# Patient Record
Sex: FEMALE | Race: WHITE | NOT HISPANIC OR LATINO | Employment: STUDENT | URBAN - METROPOLITAN AREA
[De-identification: names, ages, dates, MRNs, and addresses within clinical notes are randomized per-mention and may not be internally consistent; named-entity substitution may affect disease eponyms.]

---
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
## 2024-01-12 ENCOUNTER — TELEPHONE (OUTPATIENT)
Dept: PRIMARY CARE | Facility: CLINIC | Age: 19
End: 2024-01-12
Payer: COMMERCIAL

## 2024-01-12 NOTE — TELEPHONE ENCOUNTER
Pt mother left a voice message stating the pt has been having some housing issues, with her apartment being fixed and issues with landlord causing the pt stress and wants to know if her zoloft can be increased while she is dealing with these issues

## 2024-08-05 ENCOUNTER — OFFICE VISIT (OUTPATIENT)
Dept: PRIMARY CARE | Facility: CLINIC | Age: 19
End: 2024-08-05
Payer: COMMERCIAL

## 2024-08-05 VITALS
DIASTOLIC BLOOD PRESSURE: 70 MMHG | HEART RATE: 99 BPM | WEIGHT: 149 LBS | HEIGHT: 63 IN | SYSTOLIC BLOOD PRESSURE: 118 MMHG | OXYGEN SATURATION: 99 % | BODY MASS INDEX: 26.4 KG/M2

## 2024-08-05 DIAGNOSIS — F41.1 GAD (GENERALIZED ANXIETY DISORDER): Primary | ICD-10-CM

## 2024-08-05 PROBLEM — I47.9 TACHYCARDIA, PAROXYSMAL (MULTI): Status: RESOLVED | Noted: 2023-10-11 | Resolved: 2024-08-05

## 2024-08-05 PROBLEM — D68.01: Status: RESOLVED | Noted: 2023-10-11 | Resolved: 2024-08-05

## 2024-08-05 PROBLEM — D68.00 VON WILLEBRAND DISEASE (MULTI): Status: RESOLVED | Noted: 2023-10-11 | Resolved: 2024-08-05

## 2024-08-05 PROCEDURE — 1036F TOBACCO NON-USER: CPT

## 2024-08-05 PROCEDURE — 3008F BODY MASS INDEX DOCD: CPT

## 2024-08-05 PROCEDURE — 99213 OFFICE O/P EST LOW 20 MIN: CPT

## 2024-08-05 RX ORDER — SERTRALINE HYDROCHLORIDE 50 MG/1
50 TABLET, FILM COATED ORAL DAILY
Qty: 90 TABLET | Refills: 1 | Status: SHIPPED | OUTPATIENT
Start: 2024-08-05

## 2024-08-05 ASSESSMENT — ENCOUNTER SYMPTOMS
SLEEP DISTURBANCE: 0
FEVER: 0
NERVOUS/ANXIOUS: 1
CHILLS: 0
DYSPHORIC MOOD: 0

## 2024-08-05 ASSESSMENT — PATIENT HEALTH QUESTIONNAIRE - PHQ9
SUM OF ALL RESPONSES TO PHQ9 QUESTIONS 1 AND 2: 0
2. FEELING DOWN, DEPRESSED OR HOPELESS: NOT AT ALL
1. LITTLE INTEREST OR PLEASURE IN DOING THINGS: NOT AT ALL

## 2024-08-05 ASSESSMENT — PAIN SCALES - GENERAL: PAINLEVEL: 0-NO PAIN

## 2024-08-05 NOTE — PROGRESS NOTES
"Subjective   Patient ID: Sanjana Dahl is a 19 y.o. female who presents for Follow-up (Stopped taking zoloft /la).    Hx of NAYLA    NAYLA: patient stopped Zoloft because felt no longer needed. She was fine of medication for awhile. However, with school starting back up soon patient feels he wants to go back on as anxiety has increased. Will be a [employer] at [employer], living off campus. In the past have filled out both emotional support animal letter and [employer] disability paperwork Reduced Distractibility testing environment and priorty schedule to help reduce stress/anxiety/panic attacks. Has done therapy in the past but not now. Has tried Prozac in the past. Denies depression, SI, sleep disturbance.                 Review of Systems   Constitutional:  Negative for chills and fever.   Psychiatric/Behavioral:  Negative for dysphoric mood, sleep disturbance and suicidal ideas. The patient is nervous/anxious.        Objective   /70   Pulse 99   Ht 1.6 m (5' 3\")   Wt 67.6 kg (149 lb)   SpO2 99%   BMI 26.39 kg/m²     Physical Exam  Constitutional:       General: She is not in acute distress.     Appearance: Normal appearance.   Cardiovascular:      Rate and Rhythm: Normal rate and regular rhythm.      Heart sounds: No murmur heard.  Pulmonary:      Effort: Pulmonary effort is normal.      Breath sounds: Normal breath sounds. No wheezing, rhonchi or rales.   Skin:     General: Skin is warm and dry.      Findings: No rash.   Neurological:      Mental Status: She is alert.   Psychiatric:         Mood and Affect: Mood and affect normal.         Assessment/Plan   Diagnoses and all orders for this visit:  NAYLA (generalized anxiety disorder)  -     sertraline (Zoloft) 50 mg tablet; Take 1 tablet (50 mg) by mouth once daily.  Take 1/2 pill for I-2 weeks then can back up to 50 mg dose. Follow-up for CPE.        "

## 2024-08-13 ASSESSMENT — PROMIS GLOBAL HEALTH SCALE
RATE_PHYSICAL_HEALTH: GOOD
RATE_GENERAL_HEALTH: VERY GOOD
EMOTIONAL_PROBLEMS: OFTEN
RATE_AVERAGE_FATIGUE: MILD
RATE_SOCIAL_SATISFACTION: VERY GOOD
CARRYOUT_PHYSICAL_ACTIVITIES: COMPLETELY
RATE_MENTAL_HEALTH: GOOD
CARRYOUT_SOCIAL_ACTIVITIES: GOOD
RATE_QUALITY_OF_LIFE: GOOD
RATE_AVERAGE_PAIN: 0

## 2024-08-15 ENCOUNTER — OFFICE VISIT (OUTPATIENT)
Dept: PRIMARY CARE | Facility: CLINIC | Age: 19
End: 2024-08-15
Payer: COMMERCIAL

## 2024-08-15 VITALS
WEIGHT: 149.2 LBS | HEIGHT: 63 IN | BODY MASS INDEX: 26.44 KG/M2 | HEART RATE: 92 BPM | SYSTOLIC BLOOD PRESSURE: 108 MMHG | DIASTOLIC BLOOD PRESSURE: 66 MMHG | OXYGEN SATURATION: 99 %

## 2024-08-15 DIAGNOSIS — F41.1 GAD (GENERALIZED ANXIETY DISORDER): ICD-10-CM

## 2024-08-15 DIAGNOSIS — Z00.00 ANNUAL PHYSICAL EXAM: Primary | ICD-10-CM

## 2024-08-15 PROCEDURE — 3008F BODY MASS INDEX DOCD: CPT

## 2024-08-15 PROCEDURE — 1036F TOBACCO NON-USER: CPT

## 2024-08-15 PROCEDURE — 99395 PREV VISIT EST AGE 18-39: CPT

## 2024-08-15 RX ORDER — SERTRALINE HYDROCHLORIDE 50 MG/1
50 TABLET, FILM COATED ORAL DAILY
Qty: 90 TABLET | Refills: 1 | Status: SHIPPED | OUTPATIENT
Start: 2024-08-15

## 2024-08-15 ASSESSMENT — ENCOUNTER SYMPTOMS
PALPITATIONS: 0
ADENOPATHY: 0
MYALGIAS: 0
ARTHRALGIAS: 0
LIGHT-HEADEDNESS: 0
NAUSEA: 0
COUGH: 0
DIARRHEA: 0
SORE THROAT: 0
DYSURIA: 0
DIFFICULTY URINATING: 0
WHEEZING: 0
NERVOUS/ANXIOUS: 0
VOMITING: 0
FATIGUE: 0
SHORTNESS OF BREATH: 0
HEMATURIA: 0
BLOOD IN STOOL: 0
FEVER: 0
DIAPHORESIS: 0

## 2024-08-15 ASSESSMENT — PAIN SCALES - GENERAL: PAINLEVEL: 0-NO PAIN

## 2024-08-15 NOTE — PROGRESS NOTES
"Subjective   Patient ID: Sanjana Dahl is a 19 y.o. female who presents for Annual Exam.    Hx of NAYLA, von Wilbrand disease    Other providers: Dr. Snyder (hematologist), Ob/gyn (does not remember name)    NAYLA: Restarted 50 mg Zoloft due to school starting back up and increase in anxiety. Anxiety has imrpoved and is happy with current dose. Will be a [employer] at [employer], living off campus. In the past have filled out both emotional support animal letter and [employer] disability paperwork Reduced Distractibility testing environment and priorty schedule to help reduce stress/anxiety/panic attacks. Has done therapy in the past but not now. Has tried Prozac in the past. Denies depression, SI, sleep disturbance.     Fhx: +Anethesia related problems, heart disease, DM    HM: PAP start 20. Mammogram start 39. Colon screening start 44. Vaccinations 7/2015 UTD, HPV UTD                  Review of Systems   Constitutional:  Negative for diaphoresis, fatigue and fever.   HENT:  Negative for congestion, hearing loss and sore throat.    Eyes:  Negative for visual disturbance.   Respiratory:  Negative for cough, shortness of breath and wheezing.    Cardiovascular:  Negative for chest pain, palpitations and leg swelling.   Gastrointestinal:  Negative for blood in stool, diarrhea, nausea and vomiting.   Genitourinary:  Negative for difficulty urinating, dysuria and hematuria.   Musculoskeletal:  Negative for arthralgias and myalgias.   Skin:  Negative for rash.   Allergic/Immunologic: Negative for immunocompromised state.   Neurological:  Negative for syncope and light-headedness.   Hematological:  Negative for adenopathy.   Psychiatric/Behavioral:  Negative for suicidal ideas. The patient is not nervous/anxious.        Objective   /66   Pulse 92   Ht 1.6 m (5' 3\")   Wt 67.7 kg (149 lb 3.2 oz)   SpO2 99%   BMI 26.43 kg/m²     Physical Exam  Constitutional:       General: She is not in acute distress.     Appearance: Normal appearance. "   HENT:      Right Ear: Tympanic membrane and ear canal normal.      Left Ear: Tympanic membrane and ear canal normal.      Nose: Nose normal.      Mouth/Throat:      Mouth: Mucous membranes are moist.      Pharynx: Oropharynx is clear. No oropharyngeal exudate or posterior oropharyngeal erythema.   Eyes:      Extraocular Movements: Extraocular movements intact.      Conjunctiva/sclera: Conjunctivae normal.      Pupils: Pupils are equal, round, and reactive to light.   Neck:      Thyroid: No thyroid mass or thyromegaly.   Cardiovascular:      Rate and Rhythm: Normal rate and regular rhythm.      Pulses: Normal pulses.      Heart sounds: No murmur heard.     No gallop.   Pulmonary:      Effort: Pulmonary effort is normal.      Breath sounds: No wheezing, rhonchi or rales.   Abdominal:      General: Bowel sounds are normal.      Palpations: Abdomen is soft. There is no hepatomegaly, splenomegaly or mass.      Tenderness: There is no abdominal tenderness. There is no guarding.   Musculoskeletal:         General: Normal range of motion.      Right lower leg: No edema.      Left lower leg: No edema.   Lymphadenopathy:      Cervical: No cervical adenopathy.   Skin:     General: Skin is warm.      Findings: No rash.   Neurological:      General: No focal deficit present.      Mental Status: She is alert.      Motor: Motor function is intact. No weakness.   Psychiatric:         Mood and Affect: Mood normal.         Thought Content: Thought content normal.         Assessment/Plan   Diagnoses and all orders for this visit:  Annual physical exam  NAYLA (generalized anxiety disorder)  -     sertraline (Zoloft) 50 mg tablet; Take 1 tablet (50 mg) by mouth once daily.

## 2024-08-29 DIAGNOSIS — D68.00 VON WILLEBRAND DISEASE (MULTI): Primary | ICD-10-CM

## 2024-08-29 RX ORDER — TRANEXAMIC ACID 650 MG/1
1300 TABLET ORAL 3 TIMES DAILY
Qty: 42 TABLET | Refills: 2 | Status: SHIPPED | OUTPATIENT
Start: 2024-08-29 | End: 2024-09-05

## 2024-10-04 DIAGNOSIS — D68.00 VON WILLEBRAND DISEASE (MULTI): Primary | ICD-10-CM

## 2024-10-09 ENCOUNTER — DOCUMENTATION (OUTPATIENT)
Dept: PEDIATRIC HEMATOLOGY/ONCOLOGY | Facility: HOSPITAL | Age: 19
End: 2024-10-09
Payer: COMMERCIAL

## 2024-10-09 ENCOUNTER — HOSPITAL ENCOUNTER (OUTPATIENT)
Dept: PEDIATRIC HEMATOLOGY/ONCOLOGY | Facility: HOSPITAL | Age: 19
Discharge: HOME | End: 2024-10-09
Payer: COMMERCIAL

## 2024-10-09 VITALS
BODY MASS INDEX: 25.56 KG/M2 | WEIGHT: 149.69 LBS | RESPIRATION RATE: 18 BRPM | SYSTOLIC BLOOD PRESSURE: 112 MMHG | DIASTOLIC BLOOD PRESSURE: 77 MMHG | TEMPERATURE: 97.7 F | HEART RATE: 75 BPM | HEIGHT: 64 IN

## 2024-10-09 DIAGNOSIS — D68.00 VON WILLEBRAND DISEASE (MULTI): Primary | ICD-10-CM

## 2024-10-09 PROCEDURE — 2500000001 HC RX 250 WO HCPCS SELF ADMINISTERED DRUGS (ALT 637 FOR MEDICARE OP)

## 2024-10-09 PROCEDURE — 99215 OFFICE O/P EST HI 40 MIN: CPT

## 2024-10-09 PROCEDURE — 99211 OFF/OP EST MAY X REQ PHY/QHP: CPT

## 2024-10-09 RX ORDER — LIDOCAINE 40 MG/G
CREAM TOPICAL ONCE
Status: COMPLETED | OUTPATIENT
Start: 2024-10-09 | End: 2024-10-09

## 2024-10-09 ASSESSMENT — ENCOUNTER SYMPTOMS
OCCASIONAL FEELINGS OF UNSTEADINESS: 0
LOSS OF SENSATION IN FEET: 0
DEPRESSION: 0

## 2024-10-09 ASSESSMENT — PAIN SCALES - GENERAL: PAINLEVEL: 0-NO PAIN

## 2024-10-09 NOTE — PROGRESS NOTES
10/09/24 1629   Reason for Consult   Discipline Child Life Specialist  This child life specialist (CCLS) introduced self and to patient and patient's family. Patient familiar with child life from previous hospital experiences. Patient particularly interested in facility dog. Facility dog not available at this time.   Referral Source Physician/Resident   Total Time Spent (min) 30 minutes   Anxiety Level   Anxiety Level Patient displays anticipatory anxiety  Patient overall calm prior to IV placement. Patient verbalized that she typically passes out with needles and is more anxious about that than the IV itself. CCLS provided active listening and validation.    Patient Intervention(s)   Preparation Intervention(s) Address misconceptions;Coping skill development;Coping plan development/coordination/implemention;Medical/procedural preparation  Patient verbalized preference to lay down during IV and look away. Patient does not have a strong preference otherwise. Patient unable to identify things that have been or may be helpful. CCLS encouraged patient to drink some apple juice and provided a facility dog stuffed animal for comfort. Patient able to remain calm and cooperative for IV start, but did pass out following poke. Patient able to remain calm following episode and was quick to return to baseline.   Support Provided to Family   Family Present for Patient Session Mother     Patient/mother shared that patient is having a dental surgery tomorrow. CCLS encouraged patient/mother to share information about patient typically passing out with IVs and ensured that they would advocate for themselves as needed. CCLS advocated for numbing cream to be sent with patient to be applied in the morning and encouraged patient/mother to ask about options for anxiety (medications, IV after laughing gas, etc.). Patient and mother appreciative. No further child life needs identified at this time. CCLS will continue to follow and  provide support as needed.      Amber Samson MS, CCLS  Family and Child Life Services

## 2024-10-09 NOTE — PROGRESS NOTES
Patient in accompanied by mom for her annual & pre-treatment visit with the [facility] team. Patient has insurance and PCP is [facility]ccarry [facility]. Patient reports overall health has been good and bleeding disorder management will be discussed at this visit, see medical notes. Patient reports mental & emotional health are managed well, depressive episodes managed with no attempts or discussion of suicide, no attempts at self harm or attempting to hurt others, no manic or depressive mood swings, no rage or uncontrolled anger or outbursts, doesn't isolate, engages well with family, peers and community. managing usual and customary stressors. 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 Patient will be infused today in preparation for wisdom teeth coming out tomorrow. Mother is in the room for support as patient doesn't like needles. Patient reports doing well, reports no threats or hazards, basic needs are met, no need for support service referrals at this time. Patient soon to make an appointment with OB/GYN to update preventative care. SW to follow up as needed.     Transition Milestones:   Able to give 6-12 month history & update of BDO management at visits       Able to name and understand bleeding disorder(BDO) medication; name, why medication is needed, impact on the body & BDO management, what happens if medication is skipped, etc.      Can identify people other than parents who understands BDO and type of help needed in case of an injury or emergency.      Knows when to seek emergency care; knows what to tell them about BDO     Understands the when & why of diagnosis disclosure and understands why it's important to disclose to some but not necessary to disclose to all. Understands if and when accommodations might be needed      Able to name sports or activities that may be off limits & why     Understands  Importance of physical activity and healthy diet choices in overall health and as one with a BDO       Anushka PAL   Pediatric & Adult   [facility]

## 2024-10-11 NOTE — PROGRESS NOTES
HTC Nursing Note    Sanjana is a 19 y.o with type I von Willebrand disease who presented to the clinic today for administration of Vonvendi prior to wisdom teeth extraction and for her annual comprehensive visit. Patient was assessed by the physician, nursing, physical therapist and . 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 She is scheduled for wisdom teeth removal Thursday morning and came to clinic to receive a dose of Vonvendi.     Sanjana expressed anxiety regarding IV placement due to previous experiences of fainting during the procedure. To address this, lidocaine cream was applied to both arms prior to insertion. A 22-gauge IV was successfully placed in her left wrist. However, Sanjana fainted after the needle retraction while advancing the catheter. Her mother provided support, and a cool towel was placed on her forehead. Sanjana regained consciousness within 30 seconds and reported feeling better. Vital signs remained stable, and she consumed some juice to aid her recovery. 4,080 units (60 units/kg) of Vonvendi was infused slowly over 15 minutes, which she tolerated well. The IV was removed without complications.    Sanjana signed the patient choice policy and received education on the dosing plan for Lysteda post-surgery. She was advised to contact the clinic if she experiences any excessive oozing or bleeding and to call regarding any future procedures.

## 2024-10-11 NOTE — PROGRESS NOTES
[facility] PT Consult    Patient: Sanjana Dahl  MRN: [id number]  10/11/24    Assessment   Sanjana is a 19 y.o. with PMHx VWD who was seen by Physical Therapy in clinic on 10/9/2024 accompanied by her mother. Sanjana has past hx of 3 surgeries on L ankle. She is now s/p post L marcelo talar implant arthroplasty. Pt states she did not do formal PT after ankle surgery, but did some exercises at home. She states that her ankle does not bother her much, just gets sore sometimes if she is on her feet too much.     Upon assessment, as noted below, pt with keloid scar L anterior ankle, and mild swelling throughout L ankle. Demonstrating slight limitation with DF AROM L ankle. L ankle PF, inv and ev AROM WNL. Demonstrating normal gait pattern. She is able to complete SL calf raise on R/L, but states some pain with SL calf raise on L. Demonstrates SL stance x10 seconds R/L with no major deviations noted.     Plan/Recommendations:  No further [facility] PT needs at this time. Physical Therapy to follow during clinic visits.    Subjective   Sanjana states that she has had 3 surgeries on L ankle. Pt states she did not do formal PT after ankle surgery, but did some exercises at home. She states that her ankle does not bother her much, just gets sore sometimes if she is on her feet too much.     Past Medical History:   Past Medical History:   Diagnosis Date    Acute pharyngitis, unspecified 02/10/2015    Sore throat    Acute serous otitis media, left ear 10/08/2019    Acute serous otitis media of left ear, recurrence not specified    Acute upper respiratory infection, unspecified 02/10/2015    Viral upper respiratory infection    Acute upper respiratory infection, unspecified 10/08/2019    Viral upper respiratory tract infection    Acute upper respiratory infection, unspecified 12/17/2015    Viral upper respiratory infection    Anxiety     Clotting disorder (Multi)     Concussion without loss of consciousness, initial encounter 01/20/2021    Concussion  without loss of consciousness, initial encounter    Depression     Emotional lability 07/26/2019    Mood changes    Encounter for immunization 09/26/2016    Encounter for immunization    Other conditions influencing health status 08/25/2020    History of cough    Other conditions influencing health status 02/06/2021    History of frequent headaches    Other specified acquired deformities of musculoskeletal system 07/31/2018    Osteochondral defect of ankle    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    Otitis media, unspecified, left ear 11/24/2020    Acute left otitis media    Personal history of other (healed) physical injury and trauma 01/29/2019    History of traumatic injury of head    Personal history of other diseases of the respiratory system 11/24/2020    History of acute pharyngitis    Personal history of other diseases of the respiratory system 10/15/2019    History of acute sinusitis    Personal history of other diseases of the respiratory system 08/30/2021    History of sore throat    Personal history of other infectious and parasitic diseases 02/10/2015    History of viral infection    Personal history of other infectious and parasitic diseases 11/24/2020    History of viral infection    Personal history of other specified conditions 08/25/2020    History of diarrhea    Personal history of other specified conditions 08/25/2020    History of fever    Personal history of other specified conditions 08/25/2020    History of headache    Personal history of other specified conditions 01/29/2019    History of headache    Personal history of other specified conditions 08/30/2021    History of nasal congestion    Postconcussional syndrome 01/29/2019    Post-concussion syndrome    Shortness of breath 02/06/2021    Shortness of breath at rest       Past Surgical History:   Past Surgical History:   Procedure Laterality Date    OTHER SURGICAL HISTORY  06/10/2022    Ankle  arthroplasty    OTHER SURGICAL HISTORY  02/21/2022    Ankle surgery    OTHER SURGICAL HISTORY  02/21/2022    Ankle surgery       Prophylaxis: No    Interim Surgery History: x3 L ankle surgeries. Now s/p post L marcelo talar implant arthroplasty.      Objective   Joint Assessment:  WFL indicates no swelling, no warmth, no crepitus, no tenderness to palpation noted  Left Ankle: mild swelling noted throughout L ankle. Keloid scar anterior aspect L ankle. No warmth, crepitus or tenderness to palpation noted.   Right Ankle: WFL    Range of Motion:   Ankle Plantarflexion (0-50): Left WNL and Right WNL  Ankle Dorsiflexion Knee Extended: Left slight limitation and Right WNL  Ankle Inversion (0-30): Left WNL and Right WNL  Ankle Eversion (0-20): Left WNL and Right WNL      Mobility/Functional Testing:  Gait: WNL, no major deviations noted  DL calf raise: WNL with good heel clearance and symmetrical rise  R SL calf raise: WNL with good heel clearance  L SL calf raise: good heel clearance, symmetrical to R, but does increase pain in ankle      Balance:  R Single leg balance, firm surface, eyes open: x10 seconds with no major deviations noted   L Single leg balance, firm surface, eyes open: x10 seconds with no major deviations noted     Transitions Education Provided: Yes. Education on joint, bone and muscle health given today.     Diana Marin, PT

## 2024-10-28 NOTE — PROGRESS NOTES
CHIEF COMPLAINT  19 year old female with history of type I von Willebrand disease here for comprehensive [facility] visit.    HPI  Sanjana is 19 year old female with history of type I von Willebrand disease (Factor VIII 58, VW antigen 32, Ristocetin Cofactor  30, VW GP1bM 24). She is here with her mother for comprehensive visit.    Sanjana was last seen in clinic February 2022 after her marcelo-talar implant arthroplasty of Left ankle with Dr. Martinez at [facility]. She states doing well from her left ankle perspective. Does have slight limitations with ROM of her ankle. No swelling, pain noted. But does endorse some soreness if she is standing for prolonged time or going for extended walks. For example, was at [employer] walking throughout day and had soreness after.     Patient still has epistaxis. Was in urgent care 2023 for episode that lasted hours. Seen there but no factor required. Usually epistaxis lasts 10 minutes, infrequent but usually occurs when she is blowing her nose. No spontaneous epistaxis noted. Bleeding both side.     Sanjana has IUD placed in 2021. No breakthrough bleeding noted. Follows up with gynecology.    Still has easy bruising, no large hematomas noted. Will have bruising from unknown trauma. No blood in urine or stool. Denies any lacerations or abrasions that bled prolonged period of time.    No new medical diagnoses. She has no medication changes.    She attends [employer]. 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 Lives with her sibling there.            PAST BLEEDING HISTORY  2020: Sanjana was referred to [facility] clinic by her pediatrician for history of excessive bruising for hte last few years.. She had bruising in the absence of bumps/trauma. She had started Prozac 9 months prior ot being seen by us and the easy bruisability worsened.  She has had occasional prolonged epistaxis to the extent that mom took her to urgent care.  But epistaxis has not happened recently.  She denies  any gum bleeds/prolonged bleeding with cuts or injuries. She has occasional bright red blood in stools but  mostly associated with constipation.  She attained menarche at 9 years of age and admits to having cycles every 3 weeks and bleeds for 4 to 7 days.  Has to change 7-9 tampons daily.  She denies any big clots associated with menstrual bleed.  She denies  any fatigue/pallor/rashes on skin.  She denies any other medical illness.  She admits to ankle surgery twice for benign bone condition and does not report any major bleeding around the time.     Bleeding work up 9/8/2020: PTT 33, PT 12.8, INR 1.1, thrombin time 14, fibrinogen assay 285, Factor VIII 58, VW antigen 32, Ristocetin Cofactor  30, VW GP1bM 24.     Repeat studies 10/5/2020: PTT 35, PT 12.8, INR 1.1, Factor VIII 98, VW antigen 48, Ristocetin  Cofactor 35, VW GP1bM 35. Multimers  normal/type I    PAST MEDICAL HISTORY  Past Medical History:   Diagnosis Date    Acute pharyngitis, unspecified 02/10/2015    Sore throat    Acute serous otitis media, left ear 10/08/2019    Acute serous otitis media of left ear, recurrence not specified    Acute upper respiratory infection, unspecified 02/10/2015    Viral upper respiratory infection    Acute upper respiratory infection, unspecified 10/08/2019    Viral upper respiratory tract infection    Acute upper respiratory infection, unspecified 12/17/2015    Viral upper respiratory infection    Anxiety     Clotting disorder (Multi)     Concussion without loss of consciousness, initial encounter 01/20/2021    Concussion without loss of consciousness, initial encounter    Depression     Emotional lability 07/26/2019    Mood changes    Encounter for immunization 09/26/2016    Encounter for immunization    Other conditions influencing health status 08/25/2020    History of cough    Other conditions influencing health status 02/06/2021    History of frequent headaches    Other specified acquired deformities of  musculoskeletal system 07/31/2018    Osteochondral defect of ankle    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    Otitis media, unspecified, left ear 11/24/2020    Acute left otitis media    Personal history of other (healed) physical injury and trauma 01/29/2019    History of traumatic injury of head    Personal history of other diseases of the respiratory system 11/24/2020    History of acute pharyngitis    Personal history of other diseases of the respiratory system 10/15/2019    History of acute sinusitis    Personal history of other diseases of the respiratory system 08/30/2021    History of sore throat    Personal history of other infectious and parasitic diseases 02/10/2015    History of viral infection    Personal history of other infectious and parasitic diseases 11/24/2020    History of viral infection    Personal history of other specified conditions 08/25/2020    History of diarrhea    Personal history of other specified conditions 08/25/2020    History of fever    Personal history of other specified conditions 08/25/2020    History of headache    Personal history of other specified conditions 01/29/2019    History of headache    Personal history of other specified conditions 08/30/2021    History of nasal congestion    Postconcussional syndrome 01/29/2019    Post-concussion syndrome    Shortness of breath 02/06/2021    Shortness of breath at rest        PAST SURGICAL HISTORY  Past Surgical History:   Procedure Laterality Date    OTHER SURGICAL HISTORY  06/10/2022    Ankle arthroplasty    OTHER SURGICAL HISTORY  02/21/2022    Ankle surgery    OTHER SURGICAL HISTORY  02/21/2022    Ankle surgery        PAST FAMILY HISTORY  Family History   Problem Relation Name Age of Onset    Anesthesia related problems Mother Mom     Arthritis Mother Mom     Depression Mother Mom     Miscarriages / Stillbirths Mother Mom     No Known Problems Father      Depression Brother Surinder      "Learning disabilities Brother Surinder     Anesthesia related problems Maternal Grandmother Grammi     Alcohol abuse Maternal Grandfather Dank     Drug abuse Maternal Grandfather Dank     Heart disease Maternal Grandfather Dank     Skin cancer Paternal Grandmother Na     Diabetes Paternal Grandfather Pa     Hearing loss Paternal Grandfather Pa     Hypertension Paternal Grandfather Pa     Kidney disease Paternal Grandfather Pa     Breast cancer Neg Hx      Ovarian cancer Neg Hx      Uterine cancer Neg Hx      Cervical cancer Neg Hx      Prostate cancer Neg Hx      Colon cancer Neg Hx      Stroke Neg Hx          ROS  Review of Systems   Constitutional: Negative.  Negative for activity change, fatigue and fever.   HENT:  Positive for nosebleeds.    Eyes: Negative.         +glasses   Respiratory:  Negative for cough and shortness of breath.    Gastrointestinal: Negative.  Negative for anal bleeding, blood in stool, constipation, diarrhea, nausea and vomiting.   Endocrine: Negative.    Genitourinary:  Negative for hematuria and menstrual problem.   Musculoskeletal: Negative.  Negative for back pain and gait problem.        +soreness L ankle with prolonged use/standing   Skin:  Negative for pallor and rash.   Allergic/Immunologic: Negative.  Negative for environmental allergies and food allergies.   Neurological: Negative.  Negative for seizures, weakness and headaches.   Hematological:  Bruises/bleeds easily.   Psychiatric/Behavioral: Negative.         VITALS  Blood pressure 112/77, pulse 75, temperature 36.5 °C (97.7 °F), temperature source Oral, resp. rate 18, height 1.613 m (5' 3.5\"), weight 67.9 kg (149 lb 11.1 oz).     MEDICATION  Current Outpatient Medications on File Prior to Encounter   Medication Sig Dispense Refill    sertraline (Zoloft) 50 mg tablet Take 1 tablet (50 mg) by mouth once daily. 90 tablet 1    von Willebrand factor, recomb, (Vonvendi) 1,300 (+/-) unit range recon soln Vonvendi 4,080 units (60 " units/kg) +/-10% intravenously PRN for bleeding episodes or prior to procedures. Dispense 3 doses. Weight 68kg. Send peripheral infusion supplies. 21674 Units 0     No current facility-administered medications on file prior to encounter.        ALLERGIES  No Known Allergies     PHYSICAL EXAM  Physical Exam  Constitutional:       General: She is not in acute distress.     Appearance: Normal appearance. She is normal weight.   HENT:      Head: Normocephalic.      Nose: Nose normal. No congestion or rhinorrhea.      Mouth/Throat:      Mouth: Mucous membranes are moist.      Pharynx: Oropharynx is clear. No oropharyngeal exudate or posterior oropharyngeal erythema.   Eyes:      General:         Right eye: No discharge.         Left eye: No discharge.      Extraocular Movements: Extraocular movements intact.      Conjunctiva/sclera: Conjunctivae normal.   Cardiovascular:      Rate and Rhythm: Normal rate and regular rhythm.      Pulses: Normal pulses.      Heart sounds: Normal heart sounds. No murmur heard.  Pulmonary:      Effort: Pulmonary effort is normal. No respiratory distress.      Breath sounds: Normal breath sounds. No wheezing.   Abdominal:      General: Abdomen is flat. Bowel sounds are normal. There is no distension.      Palpations: Abdomen is soft.      Tenderness: There is no abdominal tenderness.   Musculoskeletal:         General: No swelling, deformity or signs of injury.      Cervical back: Normal range of motion.      Right lower leg: No edema.      Left lower leg: No edema.      Comments: L ankle limitations r/t past surgical proc.   Skin:     General: Skin is warm.      Capillary Refill: Capillary refill takes less than 2 seconds.      Findings: Bruising present. No erythema.   Neurological:      General: No focal deficit present.      Mental Status: She is alert and oriented to person, place, and time. Mental status is at baseline.      Motor: No weakness.   Psychiatric:         Mood and Affect:  Mood normal.         Behavior: Behavior normal.          LABS  Results for orders placed or performed in visit on 02/21/22   Factor 8 Activity    Collection Time: 02/21/22  4:02 PM   Result Value Ref Range    Factor VIII Activity 117 55 - 180 %        ASSESSMENT   Sanjana is 19 year old female with type I von Willebrand disease (Factor VIII 58, VW antigen 32, Ristocetin Cofactor  30, VW GP1bM 24) who is here for comprehensive [facility] visit. Sanjana denies any major bleeding episodes since last seen in 2022 after her left osteochondral defect of talus repair done at [facility]. Menorrhagia controlled with IUD (placed 2021).     Upcoming wisdom teeth extraction tomorrow at 730am at [facility] surgery center. Patient to receive vonvendi 60 units/kg this afternoon in [facility] clinic, start tranexamic acid today and continue for 7 days post.     Strict instructions given to Sanjana regarding when she needs to come to hospital/ED after procedure.     PLAN  - Vonvendi infusion and start tranexamic acid 1,300mg TID today (continue 7 days post)  - Major bleeding episodes or prior to surgeries: vonvendi or Humate-P 40-60 Ristocetin cofactor units/kg. Additional doses please reach out to [facility] team for plan.  - Mucosal bleeding: tranexamic acid 1,300mg TID for 5-7 days  - Patient to call if any major bleeding episodes, trauma, or surgeries  - Follow up [facility] 1 year    Kee GARVIN-AC,  [facility]

## 2024-11-15 ASSESSMENT — ENCOUNTER SYMPTOMS
DIARRHEA: 0
WEAKNESS: 0
BLOOD IN STOOL: 0
FEVER: 0
CONSTIPATION: 0
FATIGUE: 0
BACK PAIN: 0
EYES NEGATIVE: 1
PSYCHIATRIC NEGATIVE: 1
ANAL BLEEDING: 0
GASTROINTESTINAL NEGATIVE: 1
VOMITING: 0
ACTIVITY CHANGE: 0
NAUSEA: 0
MUSCULOSKELETAL NEGATIVE: 1
NEUROLOGICAL NEGATIVE: 1
SHORTNESS OF BREATH: 0
BRUISES/BLEEDS EASILY: 1
HEADACHES: 0
ENDOCRINE NEGATIVE: 1
ALLERGIC/IMMUNOLOGIC NEGATIVE: 1
SEIZURES: 0
COUGH: 0
CONSTITUTIONAL NEGATIVE: 1
HEMATURIA: 0

## 2024-12-20 ENCOUNTER — DOCUMENTATION (OUTPATIENT)
Dept: PEDIATRIC HEMATOLOGY/ONCOLOGY | Facility: HOSPITAL | Age: 19
End: 2024-12-20

## 2025-02-04 NOTE — PROGRESS NOTES
An IRB-approved patient amber for [facility] Data Set ([id number] was mailed to Sanjana Dahl on 12/20/2024. The purpose of this amber, dated 10/29/2024, was to inform the study participant that the PI has changed from Dr. Shailesh Snyder to Mr. Kee Pimentel CNP. This amber includes updated contact information in the event the patient would like to withdraw permission for the research team to use their protected health information.

## 2025-02-12 ENCOUNTER — TELEMEDICINE (OUTPATIENT)
Dept: PRIMARY CARE | Facility: CLINIC | Age: 20
End: 2025-02-12

## 2025-02-12 DIAGNOSIS — F41.1 GAD (GENERALIZED ANXIETY DISORDER): ICD-10-CM

## 2025-02-12 PROCEDURE — 1036F TOBACCO NON-USER: CPT

## 2025-02-12 PROCEDURE — 99213 OFFICE O/P EST LOW 20 MIN: CPT

## 2025-02-12 RX ORDER — SERTRALINE HYDROCHLORIDE 50 MG/1
50 TABLET, FILM COATED ORAL DAILY
Qty: 90 TABLET | Refills: 1 | Status: SHIPPED | OUTPATIENT
Start: 2025-02-12

## 2025-02-12 ASSESSMENT — PATIENT HEALTH QUESTIONNAIRE - PHQ9
SUM OF ALL RESPONSES TO PHQ9 QUESTIONS 1 AND 2: 0
1. LITTLE INTEREST OR PLEASURE IN DOING THINGS: NOT AT ALL
2. FEELING DOWN, DEPRESSED OR HOPELESS: NOT AT ALL

## 2025-02-12 ASSESSMENT — ENCOUNTER SYMPTOMS
DYSPHORIC MOOD: 0
NERVOUS/ANXIOUS: 0
CHILLS: 0
FEVER: 0

## 2025-02-12 NOTE — PROGRESS NOTES
Subjective   Patient ID: Sanjana Dahl is a 19 y.o. female who presents for AD (Adjustment Disorder).    With patient's permission, this is a Telemedicine visit with video and audio. All issues as documented below were discussed and addressed but limited physical exam was performed. If it was felt that the patient should be evaluated via face-to-face office appointment(s) they were directed there.       Hx of NAYLA, von Wilbrand disease    Other providers: Dr. Snyder (hematologist), Ob/gyn (does not remember name)    NAYLA: controlled on 50 mg Zoloft. Anxiety has improved and is happy with current dose. Will be a [employer] at [employer], living off campus. In the past have filled out both emotional support animal letter and [employer] disability paperwork Reduced Distractibility testing environment and priorty schedule to help reduce stress/anxiety/panic attacks. Has done therapy in the past but not now. Has tried Prozac in the past. Denies depression, SI, sleep disturbance.          Review of Systems   Constitutional:  Negative for chills and fever.   Psychiatric/Behavioral:  Negative for dysphoric mood and suicidal ideas. The patient is not nervous/anxious.        Objective   There were no vitals taken for this visit.    Physical Exam  Constitutional:       Appearance: Normal appearance.   Pulmonary:      Effort: Pulmonary effort is normal.   Skin:     Findings: No rash.   Neurological:      Mental Status: She is alert.   Psychiatric:         Mood and Affect: Mood and affect normal.         Assessment/Plan   Diagnoses and all orders for this visit:  NAYLA (generalized anxiety disorder)  -     sertraline (Zoloft) 50 mg tablet; Take 1 tablet (50 mg) by mouth once daily.  Follow-up 6 months for CPE

## 2025-02-14 ENCOUNTER — APPOINTMENT (OUTPATIENT)
Dept: PRIMARY CARE | Facility: CLINIC | Age: 20
End: 2025-02-14
Payer: COMMERCIAL